# Patient Record
Sex: MALE | Race: WHITE | Employment: PART TIME | URBAN - METROPOLITAN AREA
[De-identification: names, ages, dates, MRNs, and addresses within clinical notes are randomized per-mention and may not be internally consistent; named-entity substitution may affect disease eponyms.]

---

## 2018-07-23 ENCOUNTER — OFFICE VISIT (OUTPATIENT)
Dept: PRIMARY CARE CLINIC | Age: 28
End: 2018-07-23
Payer: COMMERCIAL

## 2018-07-23 VITALS
BODY MASS INDEX: 21.98 KG/M2 | TEMPERATURE: 98 F | SYSTOLIC BLOOD PRESSURE: 118 MMHG | OXYGEN SATURATION: 96 % | HEART RATE: 58 BPM | WEIGHT: 157 LBS | HEIGHT: 71 IN | DIASTOLIC BLOOD PRESSURE: 76 MMHG

## 2018-07-23 DIAGNOSIS — J30.1 CHRONIC ALLERGIC RHINITIS DUE TO POLLEN, UNSPECIFIED SEASONALITY: Primary | ICD-10-CM

## 2018-07-23 PROCEDURE — 99202 OFFICE O/P NEW SF 15 MIN: CPT | Performed by: FAMILY MEDICINE

## 2018-07-23 ASSESSMENT — PATIENT HEALTH QUESTIONNAIRE - PHQ9
SUM OF ALL RESPONSES TO PHQ QUESTIONS 1-9: 0
SUM OF ALL RESPONSES TO PHQ9 QUESTIONS 1 & 2: 0
2. FEELING DOWN, DEPRESSED OR HOPELESS: 0
1. LITTLE INTEREST OR PLEASURE IN DOING THINGS: 0

## 2018-07-23 ASSESSMENT — ENCOUNTER SYMPTOMS
WHEEZING: 0
DIARRHEA: 0
COUGH: 0
SORE THROAT: 0
EYE REDNESS: 0
EYE DISCHARGE: 0
CONSTIPATION: 0
EYE PAIN: 0
ABDOMINAL DISTENTION: 0
SHORTNESS OF BREATH: 0

## 2018-07-23 NOTE — PROGRESS NOTES
Adrián Thompson, a male of 29 y.o. came to the office 7/23/2018. Patient Active Problem List   Diagnosis    Hydrocele          HPI He is here to establish as a new patient. No complaints, but on med for allergies. Gets pnd, rhin. Allergies   Allergen Reactions    Ceclor [Cefaclor] Shortness Of Breath    Penicillins        Current Outpatient Prescriptions on File Prior to Visit   Medication Sig Dispense Refill    Multiple Vitamins-Minerals (THERAPEUTIC MULTIVITAMIN-MINERALS) tablet Take 1 tablet by mouth daily.  Loratadine 10 MG CAPS Take 1 capsule by mouth.  Ascorbic Acid (VITAMIN C) 500 MG tablet Take 500 mg by mouth daily.  ibuprofen (IBU) 800 MG tablet Take 1 tablet by mouth every 8 hours as needed for Pain for 7 days. 21 tablet 0     No current facility-administered medications on file prior to visit. Review of Systems   Constitutional: Negative for chills and fatigue. HENT: Negative for congestion, ear pain and sore throat. Eyes: Negative for pain, discharge and redness. Respiratory: Negative for cough, shortness of breath and wheezing. Cardiovascular: Negative for chest pain, palpitations and leg swelling. Gastrointestinal: Negative for abdominal distention, constipation and diarrhea. Endocrine: Negative for cold intolerance, heat intolerance and polyuria. Genitourinary: Negative for dysuria and frequency. Musculoskeletal: Negative for arthralgias, joint swelling and myalgias. Skin: Negative for pallor and rash. Allergic/Immunologic: Negative for environmental allergies and food allergies. Neurological: Negative for light-headedness, numbness and headaches. All other review of systems reviewed and are negative    OBJECTIVE:  /76   Pulse 58   Temp 98 °F (36.7 °C)   Ht 5' 11\" (1.803 m)   Wt 157 lb (71.2 kg)   SpO2 96%   BMI 21.90 kg/m²      Physical Exam   Constitutional: He is oriented to person, place, and time.  He appears well-developed and well-nourished. HENT:   Head: Normocephalic and atraumatic. Left Ear: Tympanic membrane normal.   Nose: Nose normal.   Mouth/Throat: Oropharynx is clear and moist. No oropharyngeal exudate or posterior oropharyngeal erythema. Posterior oropharyngeal edema: but clear grayish mucus right gutter. cerumen right   Eyes: Conjunctivae are normal. Pupils are equal, round, and reactive to light. Neck: Normal range of motion. Neck supple. No thyromegaly present. Cardiovascular: Normal rate, regular rhythm and normal heart sounds. Exam reveals no gallop and no friction rub. No murmur heard. Pulmonary/Chest: Effort normal and breath sounds normal. No respiratory distress. He has no wheezes. He has no rales. Abdominal: Soft. Bowel sounds are normal. There is no tenderness. There is no rebound. Musculoskeletal: Normal range of motion. Neurological: He is alert and oriented to person, place, and time. Skin: Skin is warm and dry. Psychiatric: He has a normal mood and affect. ASSESSMENT AND PLAN:    Aleena Stringer was seen today for established new doctor. Diagnoses and all orders for this visit:    Chronic allergic rhinitis due to pollen, unspecified seasonality    - continue current meds prn   - reviewed old history with him. Return for as needed. I reviewed the students documentation ( Hx, exam, MDM ), examined the patient and performed the A&P.     Oneyda Jules DO

## 2018-09-26 ENCOUNTER — NURSE ONLY (OUTPATIENT)
Dept: PRIMARY CARE CLINIC | Age: 28
End: 2018-09-26
Payer: COMMERCIAL

## 2018-09-26 DIAGNOSIS — Z23 NEED FOR INFLUENZA VACCINATION: Primary | ICD-10-CM

## 2018-09-26 PROCEDURE — 90471 IMMUNIZATION ADMIN: CPT | Performed by: FAMILY MEDICINE

## 2018-09-26 PROCEDURE — 90686 IIV4 VACC NO PRSV 0.5 ML IM: CPT | Performed by: FAMILY MEDICINE

## 2018-09-26 NOTE — PROGRESS NOTES
Vaccine Information Sheet, \"Influenza - Inactivated\"  given to Mat Fierro, or parent/legal guardian of  Mat Fierro and verbalized understanding. Patient responses:    Have you ever had a reaction to a flu vaccine? no  Are you able to eat eggs without adverse effects? Yes  Do you have any current illness? No  Have you ever had Guillian [place] Syndrome? No    Flu vaccine given per order. Please see immunization tab.

## 2019-10-21 ENCOUNTER — NURSE ONLY (OUTPATIENT)
Dept: PRIMARY CARE CLINIC | Age: 29
End: 2019-10-21
Payer: COMMERCIAL

## 2019-10-21 DIAGNOSIS — Z23 NEED FOR INFLUENZA VACCINATION: Primary | ICD-10-CM

## 2019-10-21 PROCEDURE — 90686 IIV4 VACC NO PRSV 0.5 ML IM: CPT | Performed by: FAMILY MEDICINE

## 2019-10-21 PROCEDURE — 90471 IMMUNIZATION ADMIN: CPT | Performed by: FAMILY MEDICINE

## 2021-03-30 ENCOUNTER — IMMUNIZATION (OUTPATIENT)
Dept: PRIMARY CARE CLINIC | Age: 31
End: 2021-03-30
Payer: COMMERCIAL

## 2021-03-30 PROCEDURE — 91301 COVID-19, MODERNA VACCINE 100MCG/0.5ML DOSE: CPT | Performed by: PHYSICIAN ASSISTANT

## 2021-03-30 PROCEDURE — 0011A PR IMM ADMN SARSCOV2 100 MCG/0.5 ML 1ST DOSE: CPT | Performed by: PHYSICIAN ASSISTANT

## 2021-04-27 ENCOUNTER — IMMUNIZATION (OUTPATIENT)
Dept: PRIMARY CARE CLINIC | Age: 31
End: 2021-04-27
Payer: COMMERCIAL

## 2021-04-27 PROCEDURE — 0012A COVID-19, MODERNA VACCINE 100MCG/0.5ML DOSE: CPT | Performed by: PHYSICIAN ASSISTANT

## 2021-04-27 PROCEDURE — 91301 COVID-19, MODERNA VACCINE 100MCG/0.5ML DOSE: CPT | Performed by: PHYSICIAN ASSISTANT

## 2021-05-25 ENCOUNTER — OFFICE VISIT (OUTPATIENT)
Dept: PRIMARY CARE CLINIC | Age: 31
End: 2021-05-25
Payer: COMMERCIAL

## 2021-05-25 VITALS
HEART RATE: 88 BPM | WEIGHT: 161 LBS | TEMPERATURE: 97.5 F | DIASTOLIC BLOOD PRESSURE: 80 MMHG | OXYGEN SATURATION: 97 % | SYSTOLIC BLOOD PRESSURE: 128 MMHG | BODY MASS INDEX: 22.45 KG/M2

## 2021-05-25 DIAGNOSIS — L84 CALLUS OF FOOT: Primary | ICD-10-CM

## 2021-05-25 PROCEDURE — 99212 OFFICE O/P EST SF 10 MIN: CPT | Performed by: FAMILY MEDICINE

## 2021-05-25 SDOH — ECONOMIC STABILITY: FOOD INSECURITY: WITHIN THE PAST 12 MONTHS, THE FOOD YOU BOUGHT JUST DIDN'T LAST AND YOU DIDN'T HAVE MONEY TO GET MORE.: NEVER TRUE

## 2021-05-25 ASSESSMENT — PATIENT HEALTH QUESTIONNAIRE - PHQ9
2. FEELING DOWN, DEPRESSED OR HOPELESS: 0
SUM OF ALL RESPONSES TO PHQ QUESTIONS 1-9: 0
SUM OF ALL RESPONSES TO PHQ9 QUESTIONS 1 & 2: 0

## 2021-05-25 ASSESSMENT — SOCIAL DETERMINANTS OF HEALTH (SDOH): HOW HARD IS IT FOR YOU TO PAY FOR THE VERY BASICS LIKE FOOD, HOUSING, MEDICAL CARE, AND HEATING?: NOT HARD AT ALL

## 2021-05-25 NOTE — PROGRESS NOTES
Tiana Osborn, a male of 32 y.o. came to the office 5/25/2021. Patient Active Problem List   Diagnosis    Hydrocele          HPI Left foot: hard tissue on bottom of left foot under 5 th toe. Able to scrape out several months ago and left with hole but now it's come back despite using lotion. occas pain with walking. Allergies   Allergen Reactions    Ceclor [Cefaclor] Shortness Of Breath    Penicillins        Current Outpatient Medications on File Prior to Visit   Medication Sig Dispense Refill    Multiple Vitamins-Minerals (THERAPEUTIC MULTIVITAMIN-MINERALS) tablet Take 1 tablet by mouth daily.  Loratadine 10 MG CAPS Take 1 capsule by mouth.  Ascorbic Acid (VITAMIN C) 500 MG tablet Take 500 mg by mouth daily.  ibuprofen (IBU) 800 MG tablet Take 1 tablet by mouth every 8 hours as needed for Pain for 7 days. 21 tablet 0     No current facility-administered medications on file prior to visit. Review of Systems   Musculoskeletal: Positive for gait problem. other review of systems reviewed and are negative    OBJECTIVE:  /80   Pulse 88   Temp 97.5 °F (36.4 °C)   Wt 161 lb (73 kg)   SpO2 97%   BMI 22.45 kg/m²      Physical Exam  Musculoskeletal:        Feet:    Feet:      Left foot:      Skin integrity: Callus present. ASSESSMENT AND PLAN:    James Flaherty was seen today for callouses. Diagnoses and all orders for this visit:    Callus of foot  -     R [address], [address] [place] [address], [address] [place] [address] to file down with pummus stone. Return if symptoms worsen or fail to improve.     Haylie Jules, DO

## 2021-06-02 ENCOUNTER — OFFICE VISIT (OUTPATIENT)
Dept: PODIATRY | Age: 31
End: 2021-06-02
Payer: COMMERCIAL

## 2021-06-02 VITALS — BODY MASS INDEX: 21.81 KG/M2 | HEIGHT: 72 IN | WEIGHT: 161 LBS

## 2021-06-02 DIAGNOSIS — L84 CORNS AND CALLUS: Primary | ICD-10-CM

## 2021-06-02 DIAGNOSIS — M77.42 METATARSALGIA OF LEFT FOOT: ICD-10-CM

## 2021-06-02 DIAGNOSIS — M79.672 PAIN IN LEFT FOOT: ICD-10-CM

## 2021-06-02 DIAGNOSIS — R26.2 DIFFICULTY WALKING: ICD-10-CM

## 2021-06-02 PROCEDURE — G8427 DOCREV CUR MEDS BY ELIG CLIN: HCPCS | Performed by: PODIATRIST

## 2021-06-02 PROCEDURE — G8420 CALC BMI NORM PARAMETERS: HCPCS | Performed by: PODIATRIST

## 2021-06-02 PROCEDURE — 99243 OFF/OP CNSLTJ NEW/EST LOW 30: CPT | Performed by: PODIATRIST

## 2021-06-02 NOTE — PROGRESS NOTES
Patient is in today for evaluation of callous on left foot. Patient has had it for a couple years. Patient is on his feet all day at work.  pcp is Vince Armstrong DO  Last ov 5/25/21

## 2021-06-02 NOTE — PROGRESS NOTES
21     Kaylin Baker    : 1990 Sex: male   Age: 32 y.o. Patient was referred by: Dinesh Dawkins DO  Patient's PCP/Provider is:  Dinesh Dawkins DO    Subjective:    Patient is seen today for evaluation regarding painful corn/callus to his left foot. Chief Complaint   Patient presents with   Sharon Arrow     left foot        HPI: Patient stated the issues been going on for several years but has progressively gotten worse recently. Patient has tried multiple OTC treatment options without improvement noted. Patient does go to school but does work on his feet at an area [employer]. Patient wanted to discuss other potential treatment options available at this time. Patient has had multiple lower extremity surgeries performed when he was a child. Patient has had orthotics previously as well, he currently does not wear any insoles. No other additional abnormalities noted. ROS:  Const: Positives and pertinent negatives as per HPI. Musculo: Denies symptoms other than stated above. Neuro: Denies symptoms other than stated above. Skin: Denies symptoms other than stated above. Current Medications:    Current Outpatient Medications:     Multiple Vitamins-Minerals (THERAPEUTIC MULTIVITAMIN-MINERALS) tablet, Take 1 tablet by mouth daily. , Disp: , Rfl:     Loratadine 10 MG CAPS, Take 1 capsule by mouth., Disp: , Rfl:     Ascorbic Acid (VITAMIN C) 500 MG tablet, Take 500 mg by mouth daily. , Disp: , Rfl:     ibuprofen (IBU) 800 MG tablet, Take 1 tablet by mouth every 8 hours as needed for Pain for 7 days. , Disp: 21 tablet, Rfl: 0    Allergies:   Allergies   Allergen Reactions    Ceclor [Cefaclor] Shortness Of Breath    Penicillins        Vitals:    21 1357   Weight: 161 lb (73 kg)   Height: 6' (1.829 m)        Past Medical History:   Diagnosis Date    Rhinitis     Testicular torsion     Right     Family History   Problem Relation Age of Onset    Other Father Meniere's disease    Hypertension Sister      Past Surgical History:   Procedure Laterality Date    FOOT SURGERY Right     [facility]     Social History     Tobacco Use    Smoking status: Never Smoker    Smokeless tobacco: Never Used   Substance Use Topics    Alcohol use: No    Drug use: No           Diagnostic studies:    No results found. Procedures:    None    Exam:  VASCULAR: Pedal pulses palpable left foot. Capillary fill time brisk digits 1 through 5 left foot. NEUROLOGICAL: Epicritic sensations intact left lower extremity  DERMATOLOGICAL: Hyperkeratotic area noted to the plantar left fifth MTPJ region with central darkening noted. Upon partial thickness, none excisional debridement small wood chip was removed from the area. No anesthetic was needed during the partial thickness debridement performed. No underlying abscess, ulceration, or any signs of infection noted left foot. MUSCULOSKELETAL: Tenderness noted into the plantar metatarsal head region left lower extremity with range of motion and muscle testing performed. Plan Per Assessment  Radha Rendon was seen today for callouses. Diagnoses and all orders for this visit:    Corns and callus    Metatarsalgia of left foot    Pain in left foot    Difficulty walking        1. New patient consultation and management  2. Partial-thickness debridement was performed with a #15 blade as described above to patient tolerance. We did recommend use of topical moisturizing cream to the hyperkeratotic area twice daily until resolved. 3. We did recommend purchasing OTC insoles with metatarsal head relief bilaterally. Shoe recommendations were discussed with patient in detail today as well. 4. Patient will be followed up at a later date for continued evaluation and care. He was advised to call the office with any questions or concerns in the interim.       Seen By:    Mariah Forte DPM    Electronically signed by Mariah Forte DPM on 6/2/2021 at 2:28 PM      This note was created using voice recognition software. The note was reviewed however may contain grammatical errors.

## 2021-06-02 NOTE — LETTER
[address] [place] [address] Marta   Phone: [phone]  Fax: Hector Horton, [address] [facility]  [phone]   1990 6/2/2021      Dear Mackenzie Ga,    I would like to thank you for the kind referral of Jacques Willis. He presented to the office today for evaluation regarding painful, chronic callus to the plantar left foot region. Upon partial thickness debridement to the area we did remove a small wood splinter from the region. We did discuss appropriate skin and foot care techniques to allow for improvement in symptoms. We did recommend purchasing OTC insoles regarding other foot and digital issues. We will have continued follow-up until issues are resolved. If you should have any questions concerning his visit today, please do not hesitate to contact me.     Sincerely,    Jenni Pizano, AYAH

## 2024-10-10 ASSESSMENT — PATIENT HEALTH QUESTIONNAIRE - PHQ9
SUM OF ALL RESPONSES TO PHQ QUESTIONS 1-9: 0
2. FEELING DOWN, DEPRESSED OR HOPELESS: NOT AT ALL
SUM OF ALL RESPONSES TO PHQ9 QUESTIONS 1 & 2: 0
SUM OF ALL RESPONSES TO PHQ QUESTIONS 1-9: 0
2. FEELING DOWN, DEPRESSED OR HOPELESS: NOT AT ALL
SUM OF ALL RESPONSES TO PHQ QUESTIONS 1-9: 0
SUM OF ALL RESPONSES TO PHQ QUESTIONS 1-9: 0
1. LITTLE INTEREST OR PLEASURE IN DOING THINGS: NOT AT ALL
SUM OF ALL RESPONSES TO PHQ9 QUESTIONS 1 & 2: 0
1. LITTLE INTEREST OR PLEASURE IN DOING THINGS: NOT AT ALL

## 2024-10-14 ENCOUNTER — OFFICE VISIT (OUTPATIENT)
Dept: PRIMARY CARE CLINIC | Age: 34
End: 2024-10-14
Payer: COMMERCIAL

## 2024-10-14 VITALS
BODY MASS INDEX: 23.57 KG/M2 | SYSTOLIC BLOOD PRESSURE: 122 MMHG | OXYGEN SATURATION: 99 % | HEIGHT: 72 IN | TEMPERATURE: 97 F | DIASTOLIC BLOOD PRESSURE: 80 MMHG | WEIGHT: 174 LBS | HEART RATE: 96 BPM

## 2024-10-14 DIAGNOSIS — Z00.00 ANNUAL PHYSICAL EXAM: Primary | ICD-10-CM

## 2024-10-14 DIAGNOSIS — Z00.00 ANNUAL PHYSICAL EXAM: ICD-10-CM

## 2024-10-14 LAB
ALBUMIN: 4.7 G/DL (ref 3.5–5.2)
ALP BLD-CCNC: 112 U/L (ref 40–129)
ALT SERPL-CCNC: 32 U/L (ref 0–40)
ANION GAP SERPL CALCULATED.3IONS-SCNC: 14 MMOL/L (ref 7–16)
AST SERPL-CCNC: 19 U/L (ref 0–39)
BILIRUB SERPL-MCNC: 0.5 MG/DL (ref 0–1.2)
BUN BLDV-MCNC: 15 MG/DL (ref 6–20)
CALCIUM SERPL-MCNC: 10.1 MG/DL (ref 8.6–10.2)
CHLORIDE BLD-SCNC: 103 MMOL/L (ref 98–107)
CHOLESTEROL, TOTAL: 177 MG/DL
CO2: 26 MMOL/L (ref 22–29)
CREAT SERPL-MCNC: 1 MG/DL (ref 0.7–1.2)
GFR, ESTIMATED: >90 ML/MIN/1.73M2
GLUCOSE BLD-MCNC: 89 MG/DL (ref 74–99)
HCT VFR BLD CALC: 51.2 % (ref 37–54)
HDLC SERPL-MCNC: 51 MG/DL
HEMOGLOBIN: 16.8 G/DL (ref 12.5–16.5)
LDL CHOLESTEROL: 107 MG/DL
MCH RBC QN AUTO: 29.1 PG (ref 26–35)
MCHC RBC AUTO-ENTMCNC: 32.8 G/DL (ref 32–34.5)
MCV RBC AUTO: 88.6 FL (ref 80–99.9)
PDW BLD-RTO: 12.9 % (ref 11.5–15)
PLATELET # BLD: 293 K/UL (ref 130–450)
PMV BLD AUTO: 9.5 FL (ref 7–12)
POTASSIUM SERPL-SCNC: 3.8 MMOL/L (ref 3.5–5)
RBC # BLD: 5.78 M/UL (ref 3.8–5.8)
SODIUM BLD-SCNC: 143 MMOL/L (ref 132–146)
TOTAL PROTEIN: 7.6 G/DL (ref 6.4–8.3)
TRIGL SERPL-MCNC: 94 MG/DL
VLDLC SERPL CALC-MCNC: 19 MG/DL
WBC # BLD: 8.6 K/UL (ref 4.5–11.5)

## 2024-10-14 PROCEDURE — 99385 PREV VISIT NEW AGE 18-39: CPT | Performed by: FAMILY MEDICINE

## 2024-10-14 PROCEDURE — G8484 FLU IMMUNIZE NO ADMIN: HCPCS | Performed by: FAMILY MEDICINE

## 2024-10-14 SDOH — ECONOMIC STABILITY: FOOD INSECURITY: WITHIN THE PAST 12 MONTHS, YOU WORRIED THAT YOUR FOOD WOULD RUN OUT BEFORE YOU GOT MONEY TO BUY MORE.: NEVER TRUE

## 2024-10-14 SDOH — ECONOMIC STABILITY: FOOD INSECURITY: WITHIN THE PAST 12 MONTHS, THE FOOD YOU BOUGHT JUST DIDN'T LAST AND YOU DIDN'T HAVE MONEY TO GET MORE.: NEVER TRUE

## 2024-10-14 SDOH — ECONOMIC STABILITY: INCOME INSECURITY: HOW HARD IS IT FOR YOU TO PAY FOR THE VERY BASICS LIKE FOOD, HOUSING, MEDICAL CARE, AND HEATING?: NOT HARD AT ALL

## 2024-10-14 NOTE — PROGRESS NOTES
SUBJECTIVE:    HPI: Allen Yanez  Was seen here today for   Chief Complaint   Patient presents with    Annual Exam    .  He states they are dealing with no new issues.     Past Medical History:   Diagnosis Date    Rhinitis     Testicular torsion 1999    Right       Past Surgical History:   Procedure Laterality Date    FOOT SURGERY Right     [facility]       Family History   Problem Relation Age of Onset    Other Father         Meniere's disease    Hypertension Sister        Prior to Admission medications    Medication Sig Start Date End Date Taking? Authorizing Provider   Multiple Vitamins-Minerals (THERAPEUTIC MULTIVITAMIN-MINERALS) tablet Take 1 tablet by mouth daily   Yes Dolly Amezcua MD   Loratadine 10 MG CAPS Take 1 capsule by mouth   Yes ProviderDolly MD   Ascorbic Acid (VITAMIN C) 500 MG tablet Take 1 tablet by mouth daily   Yes Dolly Amezcua MD   ibuprofen (IBU) 800 MG tablet Take 1 tablet by mouth every 8 hours as needed for Pain for 7 days. 1/6/14 1/13/14  Saeid Burrell MD       Ceclor [cefaclor] and Penicillins    Social History     Tobacco Use    Smoking status: Never    Smokeless tobacco: Never   Substance Use Topics    Alcohol use: No         Review Of Systems:    Skin: no abnormal pigmentation, rash, scaling, itching, masses, hair or nail changes  Eyes: no blurring, diplopia, or eye pain  Ears/Nose/Throat: no hearing loss, tinnitus, vertigo, nosebleed, nasal congestion, rhinorrhea, sore throat  Respiratory: no cough, pleuritic chest pain, dyspnea, or wheezing  Cardiovascular: no chest pain, angina, dyspnea on exertion, orthopnea, PND, palpitations, or claudication  Gastrointestinal: no nausea, vomiting, heartburn, diarrhea, constipation, bloating,  abdominal pain, or blood per rectum  Genitourinary: no urinary urgency, frequency, dysuria, nocturia, hesitancy, or incontinence  Musculoskeletal: no arthritis, arthralgia, myalgia, weakness, or morning stiffness  Neurologic: